# Patient Record
Sex: FEMALE | Race: WHITE | Employment: PART TIME | URBAN - METROPOLITAN AREA
[De-identification: names, ages, dates, MRNs, and addresses within clinical notes are randomized per-mention and may not be internally consistent; named-entity substitution may affect disease eponyms.]

---

## 2024-01-06 ENCOUNTER — HOSPITAL ENCOUNTER (INPATIENT)
Age: 29
LOS: 1 days | Discharge: HOME | DRG: 769 | End: 2024-01-08
Attending: OBSTETRICS & GYNECOLOGY | Admitting: OBSTETRICS & GYNECOLOGY
Payer: COMMERCIAL

## 2024-01-06 PROCEDURE — G0379 DIRECT REFER HOSPITAL OBSERV: HCPCS

## 2024-01-06 PROCEDURE — G0378 HOSPITAL OBSERVATION PER HR: HCPCS

## 2024-01-07 PROBLEM — Z98.890 POST-OPERATIVE STATE: Status: ACTIVE | Noted: 2024-01-07

## 2024-01-07 LAB
BASOPHILS # BLD: <0.03 K/UL (ref 0–0.2)
BASOPHILS NFR BLD: 0 % (ref 0–2)
BUN SERPL-MCNC: 11 MG/DL (ref 6–20)
CREAT SERPL-MCNC: 0.4 MG/DL (ref 0.5–0.9)
EOSINOPHIL # BLD: 0.13 K/UL (ref 0–0.44)
EOSINOPHILS RELATIVE PERCENT: 1 % (ref 1–4)
ERYTHROCYTE [DISTWIDTH] IN BLOOD BY AUTOMATED COUNT: 12.9 % (ref 11.8–14.4)
GFR SERPL CREATININE-BSD FRML MDRD: >60 ML/MIN/1.73M2
HCT VFR BLD AUTO: 26.3 % (ref 36.3–47.1)
HGB BLD-MCNC: 8.5 G/DL (ref 11.9–15.1)
IMM GRANULOCYTES # BLD AUTO: 0.21 K/UL (ref 0–0.3)
IMM GRANULOCYTES NFR BLD: 1 %
LYMPHOCYTES NFR BLD: 1.54 K/UL (ref 1.1–3.7)
LYMPHOCYTES RELATIVE PERCENT: 10 % (ref 24–43)
MCH RBC QN AUTO: 30.2 PG (ref 25.2–33.5)
MCHC RBC AUTO-ENTMCNC: 32.3 G/DL (ref 28.4–34.8)
MCV RBC AUTO: 93.6 FL (ref 82.6–102.9)
MONOCYTES NFR BLD: 0.96 K/UL (ref 0.1–1.2)
MONOCYTES NFR BLD: 6 % (ref 3–12)
NEUTROPHILS NFR BLD: 82 % (ref 36–65)
NEUTS SEG NFR BLD: 12.62 K/UL (ref 1.5–8.1)
NRBC BLD-RTO: 0 PER 100 WBC
PLATELET # BLD AUTO: 500 K/UL (ref 138–453)
PMV BLD AUTO: 8.3 FL (ref 8.1–13.5)
RBC # BLD AUTO: 2.81 M/UL (ref 3.95–5.11)
WBC OTHER # BLD: 15.5 K/UL (ref 3.5–11.3)

## 2024-01-07 PROCEDURE — 96375 TX/PRO/DX INJ NEW DRUG ADDON: CPT

## 2024-01-07 PROCEDURE — 36415 COLL VENOUS BLD VENIPUNCTURE: CPT

## 2024-01-07 PROCEDURE — 6370000000 HC RX 637 (ALT 250 FOR IP)

## 2024-01-07 PROCEDURE — 6360000002 HC RX W HCPCS: Performed by: OBSTETRICS & GYNECOLOGY

## 2024-01-07 PROCEDURE — 99222 1ST HOSP IP/OBS MODERATE 55: CPT | Performed by: SURGERY

## 2024-01-07 PROCEDURE — 6360000002 HC RX W HCPCS

## 2024-01-07 PROCEDURE — 84520 ASSAY OF UREA NITROGEN: CPT

## 2024-01-07 PROCEDURE — G0378 HOSPITAL OBSERVATION PER HR: HCPCS

## 2024-01-07 PROCEDURE — 2580000003 HC RX 258: Performed by: OBSTETRICS & GYNECOLOGY

## 2024-01-07 PROCEDURE — 85025 COMPLETE CBC W/AUTO DIFF WBC: CPT

## 2024-01-07 PROCEDURE — 82565 ASSAY OF CREATININE: CPT

## 2024-01-07 PROCEDURE — 96366 THER/PROPH/DIAG IV INF ADDON: CPT

## 2024-01-07 PROCEDURE — 2580000003 HC RX 258

## 2024-01-07 PROCEDURE — 96365 THER/PROPH/DIAG IV INF INIT: CPT

## 2024-01-07 RX ORDER — OXYCODONE HYDROCHLORIDE 5 MG/1
5 TABLET ORAL EVERY 4 HOURS PRN
Status: DISCONTINUED | OUTPATIENT
Start: 2024-01-07 | End: 2024-01-08 | Stop reason: HOSPADM

## 2024-01-07 RX ORDER — ONDANSETRON 2 MG/ML
4 INJECTION INTRAMUSCULAR; INTRAVENOUS EVERY 6 HOURS PRN
Status: DISCONTINUED | OUTPATIENT
Start: 2024-01-07 | End: 2024-01-08 | Stop reason: HOSPADM

## 2024-01-07 RX ORDER — METRONIDAZOLE 500 MG/1
500 TABLET ORAL 2 TIMES DAILY
Status: DISCONTINUED | OUTPATIENT
Start: 2024-01-07 | End: 2024-01-08 | Stop reason: HOSPADM

## 2024-01-07 RX ORDER — FERROUS SULFATE 325(65) MG
325 TABLET ORAL 2 TIMES DAILY
Qty: 180 TABLET | Refills: 3 | Status: SHIPPED | OUTPATIENT
Start: 2024-01-07

## 2024-01-07 RX ORDER — SODIUM CHLORIDE 9 MG/ML
INJECTION, SOLUTION INTRAVENOUS CONTINUOUS
Status: DISCONTINUED | OUTPATIENT
Start: 2024-01-07 | End: 2024-01-08 | Stop reason: HOSPADM

## 2024-01-07 RX ORDER — LACTOBACILLUS RHAMNOSUS GG 10B CELL
1 CAPSULE ORAL
Status: DISCONTINUED | OUTPATIENT
Start: 2024-01-07 | End: 2024-01-08 | Stop reason: HOSPADM

## 2024-01-07 RX ORDER — IBUPROFEN 800 MG/1
800 TABLET ORAL EVERY 8 HOURS SCHEDULED
Status: DISCONTINUED | OUTPATIENT
Start: 2024-01-07 | End: 2024-01-08

## 2024-01-07 RX ORDER — ACETAMINOPHEN 500 MG
1000 TABLET ORAL EVERY 8 HOURS SCHEDULED
Status: DISCONTINUED | OUTPATIENT
Start: 2024-01-07 | End: 2024-01-08

## 2024-01-07 RX ORDER — DOCUSATE SODIUM 100 MG/1
100 CAPSULE, LIQUID FILLED ORAL DAILY
Status: DISCONTINUED | OUTPATIENT
Start: 2024-01-07 | End: 2024-01-08 | Stop reason: HOSPADM

## 2024-01-07 RX ORDER — POLYETHYLENE GLYCOL 3350 17 G/17G
17 POWDER, FOR SOLUTION ORAL DAILY
Status: DISCONTINUED | OUTPATIENT
Start: 2024-01-07 | End: 2024-01-08 | Stop reason: HOSPADM

## 2024-01-07 RX ORDER — ONDANSETRON 4 MG/1
4 TABLET, ORALLY DISINTEGRATING ORAL EVERY 8 HOURS PRN
Status: DISCONTINUED | OUTPATIENT
Start: 2024-01-07 | End: 2024-01-08 | Stop reason: HOSPADM

## 2024-01-07 RX ADMIN — Medication 1 CAPSULE: at 09:09

## 2024-01-07 RX ADMIN — Medication 1000 MG: at 05:25

## 2024-01-07 RX ADMIN — METRONIDAZOLE 500 MG: 500 TABLET, FILM COATED ORAL at 09:09

## 2024-01-07 RX ADMIN — ACETAMINOPHEN 1000 MG: 500 TABLET ORAL at 13:13

## 2024-01-07 RX ADMIN — METRONIDAZOLE 500 MG: 500 TABLET, FILM COATED ORAL at 03:51

## 2024-01-07 RX ADMIN — SODIUM CHLORIDE: 9 INJECTION, SOLUTION INTRAVENOUS at 20:26

## 2024-01-07 RX ADMIN — METRONIDAZOLE 500 MG: 500 TABLET, FILM COATED ORAL at 20:23

## 2024-01-07 RX ADMIN — VANCOMYCIN HYDROCHLORIDE 1000 MG: 1 INJECTION, POWDER, LYOPHILIZED, FOR SOLUTION INTRAVENOUS at 20:27

## 2024-01-07 RX ADMIN — IBUPROFEN 800 MG: 800 TABLET, FILM COATED ORAL at 20:23

## 2024-01-07 RX ADMIN — POLYETHYLENE GLYCOL 3350 17 G: 17 POWDER, FOR SOLUTION ORAL at 09:10

## 2024-01-07 RX ADMIN — ACETAMINOPHEN 1000 MG: 500 TABLET ORAL at 05:26

## 2024-01-07 RX ADMIN — IBUPROFEN 800 MG: 800 TABLET, FILM COATED ORAL at 14:15

## 2024-01-07 RX ADMIN — SODIUM CHLORIDE: 9 INJECTION, SOLUTION INTRAVENOUS at 11:55

## 2024-01-07 RX ADMIN — DOCUSATE SODIUM 100 MG: 100 CAPSULE, LIQUID FILLED ORAL at 09:09

## 2024-01-07 RX ADMIN — VANCOMYCIN HYDROCHLORIDE 1500 MG: 1.5 INJECTION, POWDER, LYOPHILIZED, FOR SOLUTION INTRAVENOUS at 02:17

## 2024-01-07 RX ADMIN — ACETAMINOPHEN 1000 MG: 500 TABLET ORAL at 02:18

## 2024-01-07 RX ADMIN — VANCOMYCIN HYDROCHLORIDE 1000 MG: 1 INJECTION, POWDER, LYOPHILIZED, FOR SOLUTION INTRAVENOUS at 13:20

## 2024-01-07 RX ADMIN — IBUPROFEN 800 MG: 800 TABLET, FILM COATED ORAL at 05:26

## 2024-01-07 RX ADMIN — ACETAMINOPHEN 1000 MG: 500 TABLET ORAL at 20:23

## 2024-01-07 RX ADMIN — IBUPROFEN 800 MG: 800 TABLET, FILM COATED ORAL at 02:18

## 2024-01-07 RX ADMIN — SODIUM CHLORIDE: 9 INJECTION, SOLUTION INTRAVENOUS at 02:13

## 2024-01-07 ASSESSMENT — LIFESTYLE VARIABLES
HOW MANY STANDARD DRINKS CONTAINING ALCOHOL DO YOU HAVE ON A TYPICAL DAY: PATIENT DOES NOT DRINK
HOW OFTEN DO YOU HAVE A DRINK CONTAINING ALCOHOL: NEVER

## 2024-01-07 ASSESSMENT — ENCOUNTER SYMPTOMS
ABDOMINAL PAIN: 1
COLOR CHANGE: 1
VOMITING: 0
DIARRHEA: 0
SHORTNESS OF BREATH: 0
NAUSEA: 0

## 2024-01-07 ASSESSMENT — PAIN DESCRIPTION - LOCATION
LOCATION: ABDOMEN
LOCATION: ABDOMEN

## 2024-01-07 ASSESSMENT — PAIN DESCRIPTION - ORIENTATION
ORIENTATION: LOWER
ORIENTATION: LOWER

## 2024-01-07 ASSESSMENT — PAIN SCALES - GENERAL
PAINLEVEL_OUTOF10: 6
PAINLEVEL_OUTOF10: 4
PAINLEVEL_OUTOF10: 3

## 2024-01-07 ASSESSMENT — PAIN DESCRIPTION - DESCRIPTORS
DESCRIPTORS: SORE;SHARP
DESCRIPTORS: ACHING

## 2024-01-07 ASSESSMENT — PAIN - FUNCTIONAL ASSESSMENT: PAIN_FUNCTIONAL_ASSESSMENT: ACTIVITIES ARE NOT PREVENTED

## 2024-01-07 NOTE — CARE COORDINATION
DISCHARGE PLANNING EVALUATION: OP/OBSERVATION        24, 4:16 PM TROY    Maribell Tellez         Location: OBS    Reason for hospitalization:  section wound seroma, postpartum [O90.89]  Post-operative state [Z98.890]     Patient sleeping during visit

## 2024-01-07 NOTE — DISCHARGE SUMMARY
OBGyn Discharge Summary  [facility]      Patient Name: Maribell Tellez  Patient : 1995  Primary Care Physician: No primary care provider on file.  Admit Date: 2024    Principal Diagnosis:  Incisional Site Abscess vs Seroma     Other Diagnosis:    section wound seroma, postpartum [O90.89]  Post-operative state [Z98.890]  Postoperative abscess [T81.49XA]  Patient Active Problem List   Diagnosis     section wound seroma, postpartum    PLTCS 23 2/2 Transverse Fetal Lie    Postoperative abscess       Infection: Seroma vs abscess (awaiting cultures)  Hospital Acquired: Possibly in postoperative period    Surgical Operations & Procedures: IR aspiration    Consultations: general surgery and interventional radiology    Pertinent Findings & Procedures:   Maribell Tellez is a 28 y.o. female admitted for further evaluation and management of Incisional Site Abscess vs Seroma ; received Rocephin, Vanc, Flagyl.  She underwent IR aspiration on 24. Follow up in 1 week. Discharge instructions reviewed and questions answered.    HD #1 (24): WBC increased from 13.4 in OSH to 15.5. Hgb noted to be 8.5. PO iron sent on d/c. General surgery consulted and recommended continued antibiotics and observation.    HD#2 (24):  WBC decreased to 13.2. Hgb 9.1. General Surgery recommended IR aspiration and drain placement, patient underwent IR aspiration. Patient discharged home with Clindamycin x10 days.    Discharge to: Home    Readmission planned: No    Recommendations on Discharge:     Medications:       Medication List        START taking these medications      clindamycin 300 MG capsule  Commonly known as: CLEOCIN  Take 1 capsule by mouth 3 times daily for 10 days     ferrous sulfate 325 (65 Fe) MG tablet  Commonly known as: IRON 325  Take 1 tablet by mouth 2 times daily               Where to Get Your Medications        These medications were sent to [facility] -

## 2024-01-07 NOTE — H&P
OB/GYN H&P  [facility]    Patient Name: Maribell Tellez     Patient : 1995  Room/Bed: OBS   Admission Date/Time: 2024 11:20 PM  Primary Care Physician: No primary care provider on file.    CC: C/S incisional pain             HPI: Maribell Tellez is a 28 y.o.  who presents s/p PLTCS on 23 for concern of an incisional site abscess/seroma.  Patient was transferred from [facility] after being admitted on 23 with fevers at home and abdominal pain.  Patient was found to have a fever up to 1 3 in the emergency department.  At that time, WBC count was noted to be 13.4.  CT abdomen pelvis done at that time showed \"Thickening of lower rectus muscle which also contains some fluid. Fluid approximately 9.5 x 1.5 x 8 cm in size. Could be seroma or evolving hematoma. Abscess not excluded.\"    Patient states that she feels increased pain on her left side versus right.  She states at first there was no indication or abnormality on visual inspection of her  section incision however from Thursday night onward she noted an increasing red, warm to touch, tender site clearly discernible from the surrounding tissue on her incision.    Patient is a transfer from [facility] due to desire for higher care in the inpatient setting.    Patient currently rates her pain a 4 out of 10.  States she has a \"high pain tolerance\" however this abscess versus seroma is very painful.    Patient is pumping and breast-feeding, denies signs and symptoms of mastitis.  Denies signs and symptoms of urinary tract infections such as dysuria, hematuria, urinary frequency.  Additionally, patient denies foul-smelling vaginal discharge or pelvic pain.    REVIEW OF SYSTEMS:   A minimum of an eleven point review of systems was completed.    Constitutional: negative fever, positive intermittent chills   HEENT: negative visual disturbances, negative headaches  Respiratory: negative dyspnea, negative

## 2024-01-07 NOTE — PLAN OF CARE
Problem: Discharge Planning  Goal: Discharge to home or other facility with appropriate resources  Outcome: Progressing  Flowsheets  Taken 1/7/2024 0036  Discharge to home or other facility with appropriate resources: Identify barriers to discharge with patient and caregiver  Taken 1/7/2024 0035  Discharge to home or other facility with appropriate resources:   Identify barriers to discharge with patient and caregiver   Arrange for needed discharge resources and transportation as appropriate   Identify discharge learning needs (meds, wound care, etc)

## 2024-01-07 NOTE — CONSULTS
General Surgery  Consult    PATIENT NAME: Maribell Tellez  AGE: 28 y.o.  MEDICAL RECORD NO. [id number]  DATE: 2024  SURGEON: Dr. George  PRIMARY CARE PHYSICIAN: No primary care provider on file.    Patient evaluated at the request of  Dr. Hankins  Reason for evaluation: Recent , post-op seroma vs abscess    Patient information was obtained from patient and past medical records.  History/Exam limitations: none.    IMPRESSION:     Patient Active Problem List   Diagnosis     section wound seroma, postpartum    PLTCS 23 2/2 Transverse Fetal Lie   27 y/o female with history of recent , concern for post-op seroma vs abscess formation      PLAN:   -Patient seen and evaluated.  History, physical exam, laboratory, and radiographic findings reviewed and discussed with attending.  -No acute surgical intervention indicated at this time.   -Plan to continue to monitor clinically on antibiotics. If symptoms fail to improve or pt clinically worsens, will consider need for further intervention.   -Continue antibiotics per primary.   -Monitor vitals per unit standard  -Remainder of plan and disposition per primary team.        HISTORY:   History of Chief Complaint:    Maribell Tellez is a 28 y.o. female with history of recent  section performed on 2023, who presents with several days of incision site pain and complains of redness around the incision.  Patient states that her symptoms began 2 days ago.  Patient states that yesterday she began experiencing chills and subjective fevers which prompted her to seek evaluation in the emergency department.  Patient originally presented to [facility]'s emergency department patient was found to have a mild leukocytosis and CT findings concerning for possible seroma versus abscess formation at recent incision site.  Patient was transferred to [place] for further evaluation and treatment.  Patient was started on IV antibiotics including

## 2024-01-08 ENCOUNTER — APPOINTMENT (OUTPATIENT)
Dept: INTERVENTIONAL RADIOLOGY/VASCULAR | Age: 29
DRG: 769 | End: 2024-01-08
Attending: OBSTETRICS & GYNECOLOGY
Payer: COMMERCIAL

## 2024-01-08 VITALS
DIASTOLIC BLOOD PRESSURE: 79 MMHG | TEMPERATURE: 97.5 F | SYSTOLIC BLOOD PRESSURE: 117 MMHG | HEIGHT: 67 IN | WEIGHT: 155.87 LBS | OXYGEN SATURATION: 96 % | BODY MASS INDEX: 24.46 KG/M2 | HEART RATE: 70 BPM | RESPIRATION RATE: 18 BRPM

## 2024-01-08 PROBLEM — T81.49XA POSTOPERATIVE ABSCESS: Status: ACTIVE | Noted: 2024-01-08

## 2024-01-08 LAB
BASOPHILS # BLD: <0.03 K/UL (ref 0–0.2)
BASOPHILS NFR BLD: 0 % (ref 0–2)
EOSINOPHIL # BLD: 0.14 K/UL (ref 0–0.44)
EOSINOPHILS RELATIVE PERCENT: 1 % (ref 1–4)
ERYTHROCYTE [DISTWIDTH] IN BLOOD BY AUTOMATED COUNT: 13.1 % (ref 11.8–14.4)
HCT VFR BLD AUTO: 28.8 % (ref 36.3–47.1)
HGB BLD-MCNC: 9.1 G/DL (ref 11.9–15.1)
IMM GRANULOCYTES # BLD AUTO: 0.09 K/UL (ref 0–0.3)
IMM GRANULOCYTES NFR BLD: 1 %
INR PPP: 1
LYMPHOCYTES NFR BLD: 1.83 K/UL (ref 1.1–3.7)
LYMPHOCYTES RELATIVE PERCENT: 14 % (ref 24–43)
MCH RBC QN AUTO: 30.2 PG (ref 25.2–33.5)
MCHC RBC AUTO-ENTMCNC: 31.6 G/DL (ref 28.4–34.8)
MCV RBC AUTO: 95.7 FL (ref 82.6–102.9)
MONOCYTES NFR BLD: 0.58 K/UL (ref 0.1–1.2)
MONOCYTES NFR BLD: 4 % (ref 3–12)
NEUTROPHILS NFR BLD: 80 % (ref 36–65)
NEUTS SEG NFR BLD: 10.53 K/UL (ref 1.5–8.1)
NRBC BLD-RTO: 0 PER 100 WBC
PLATELET # BLD AUTO: 576 K/UL (ref 138–453)
PMV BLD AUTO: 8.6 FL (ref 8.1–13.5)
PROTHROMBIN TIME: 13.3 SEC (ref 11.7–14.9)
RBC # BLD AUTO: 3.01 M/UL (ref 3.95–5.11)
WBC OTHER # BLD: 13.2 K/UL (ref 3.5–11.3)

## 2024-01-08 PROCEDURE — 99232 SBSQ HOSP IP/OBS MODERATE 35: CPT | Performed by: SURGERY

## 2024-01-08 PROCEDURE — 0W9F3ZX DRAINAGE OF ABDOMINAL WALL, PERCUTANEOUS APPROACH, DIAGNOSTIC: ICD-10-PCS | Performed by: RADIOLOGY

## 2024-01-08 PROCEDURE — 99231 SBSQ HOSP IP/OBS SF/LOW 25: CPT | Performed by: STUDENT IN AN ORGANIZED HEALTH CARE EDUCATION/TRAINING PROGRAM

## 2024-01-08 PROCEDURE — 85025 COMPLETE CBC W/AUTO DIFF WBC: CPT

## 2024-01-08 PROCEDURE — 87070 CULTURE OTHR SPECIMN AEROBIC: CPT

## 2024-01-08 PROCEDURE — 6370000000 HC RX 637 (ALT 250 FOR IP)

## 2024-01-08 PROCEDURE — 10160 PNXR ASPIR ABSC HMTMA BULLA: CPT

## 2024-01-08 PROCEDURE — 1200000000 HC SEMI PRIVATE

## 2024-01-08 PROCEDURE — 6360000002 HC RX W HCPCS: Performed by: OBSTETRICS & GYNECOLOGY

## 2024-01-08 PROCEDURE — 6360000002 HC RX W HCPCS

## 2024-01-08 PROCEDURE — 76942 ECHO GUIDE FOR BIOPSY: CPT

## 2024-01-08 PROCEDURE — 6370000000 HC RX 637 (ALT 250 FOR IP): Performed by: STUDENT IN AN ORGANIZED HEALTH CARE EDUCATION/TRAINING PROGRAM

## 2024-01-08 PROCEDURE — 2580000003 HC RX 258

## 2024-01-08 PROCEDURE — 96366 THER/PROPH/DIAG IV INF ADDON: CPT

## 2024-01-08 PROCEDURE — 96376 TX/PRO/DX INJ SAME DRUG ADON: CPT

## 2024-01-08 PROCEDURE — 85610 PROTHROMBIN TIME: CPT

## 2024-01-08 PROCEDURE — 87075 CULTR BACTERIA EXCEPT BLOOD: CPT

## 2024-01-08 PROCEDURE — 87205 SMEAR GRAM STAIN: CPT

## 2024-01-08 PROCEDURE — 36415 COLL VENOUS BLD VENIPUNCTURE: CPT

## 2024-01-08 PROCEDURE — 2580000003 HC RX 258: Performed by: OBSTETRICS & GYNECOLOGY

## 2024-01-08 RX ORDER — IBUPROFEN 600 MG/1
600 TABLET ORAL EVERY 6 HOURS
Status: DISCONTINUED | OUTPATIENT
Start: 2024-01-08 | End: 2024-01-08 | Stop reason: HOSPADM

## 2024-01-08 RX ORDER — ACETAMINOPHEN 500 MG
1000 TABLET ORAL EVERY 6 HOURS
Status: DISCONTINUED | OUTPATIENT
Start: 2024-01-08 | End: 2024-01-08 | Stop reason: HOSPADM

## 2024-01-08 RX ORDER — CLINDAMYCIN HYDROCHLORIDE 300 MG/1
300 CAPSULE ORAL 3 TIMES DAILY
Qty: 30 CAPSULE | Refills: 0 | Status: SHIPPED | OUTPATIENT
Start: 2024-01-08 | End: 2024-01-18

## 2024-01-08 RX ADMIN — Medication 1 CAPSULE: at 08:29

## 2024-01-08 RX ADMIN — IBUPROFEN 600 MG: 600 TABLET, FILM COATED ORAL at 16:29

## 2024-01-08 RX ADMIN — ACETAMINOPHEN 1000 MG: 500 TABLET ORAL at 05:18

## 2024-01-08 RX ADMIN — DOCUSATE SODIUM 100 MG: 100 CAPSULE, LIQUID FILLED ORAL at 08:29

## 2024-01-08 RX ADMIN — IBUPROFEN 600 MG: 600 TABLET, FILM COATED ORAL at 11:35

## 2024-01-08 RX ADMIN — ACETAMINOPHEN 1000 MG: 500 TABLET ORAL at 11:34

## 2024-01-08 RX ADMIN — Medication 1000 MG: at 01:37

## 2024-01-08 RX ADMIN — POLYETHYLENE GLYCOL 3350 17 G: 17 POWDER, FOR SOLUTION ORAL at 08:29

## 2024-01-08 RX ADMIN — IBUPROFEN 800 MG: 800 TABLET, FILM COATED ORAL at 05:18

## 2024-01-08 RX ADMIN — VANCOMYCIN HYDROCHLORIDE 1000 MG: 1 INJECTION, POWDER, LYOPHILIZED, FOR SOLUTION INTRAVENOUS at 12:20

## 2024-01-08 RX ADMIN — VANCOMYCIN HYDROCHLORIDE 1000 MG: 1 INJECTION, POWDER, LYOPHILIZED, FOR SOLUTION INTRAVENOUS at 05:17

## 2024-01-08 RX ADMIN — SODIUM CHLORIDE: 9 INJECTION, SOLUTION INTRAVENOUS at 05:16

## 2024-01-08 RX ADMIN — METRONIDAZOLE 500 MG: 500 TABLET, FILM COATED ORAL at 08:29

## 2024-01-08 RX ADMIN — ACETAMINOPHEN 1000 MG: 500 TABLET ORAL at 16:29

## 2024-01-08 ASSESSMENT — PAIN DESCRIPTION - LOCATION
LOCATION: ABDOMEN

## 2024-01-08 ASSESSMENT — PAIN SCALES - GENERAL
PAINLEVEL_OUTOF10: 3
PAINLEVEL_OUTOF10: 0

## 2024-01-08 ASSESSMENT — PAIN DESCRIPTION - ORIENTATION
ORIENTATION: LEFT;MID
ORIENTATION: LEFT;MID

## 2024-01-08 NOTE — PLAN OF CARE
No general surgery intervention. Okay for discharge.     Valentina Goldstein MD  PGY-4 General Surgery  5:08 PM 01/08/24

## 2024-01-08 NOTE — CARE COORDINATION
DISCHARGE PLANNING EVALUATION: OP/OBSERVATION        24, 9:44 AM TROY Tellez         Location: OBS    Reason for hospitalization:  section wound seroma, postpartum [O90.89]  Post-operative state [Z98.890]     CM Services requested for transitional needs.     PCP: No primary care provider on file.    Transportation/Food Security/Housekeeping Addressed:  No issues identified.     Equipment needs:     Transition plan: no anticipated needs

## 2024-01-08 NOTE — BRIEF OP NOTE
Brief Postoperative Note    Maribell Tellez  YOB: 1995  [id number]    Pre-operative Diagnosis: anterior abdominal wall fluid collection.      Post-operative Diagnosis: Same    Procedure: Fluid collection drainage    Medication Given: none    Anesthesia: 1%Lidocaine     Surgeons/Assistants:  MD Hitesh     Estimated Blood Loss: Minimal    Complications: none    Specimen: Was collected    Procedure:  Successful aspiration of anterior abdominal wall fluid collection  Approximately 4 mL of bloody fluid was obtained and sent for cultures.  Pt tolerated procedure well and left the department in stable condition.      Electronically signed by BROOK Pacheco on 1/8/2024 at 4:37 PM

## 2024-01-08 NOTE — PROGRESS NOTES
PROGRESS NOTE          PATIENT NAME: Maribell Tellez  MEDICAL RECORD NO. [id number]  DATE: 2024  SURGEON: Ariel  PRIMARY CARE PHYSICIAN: No primary care provider on file.    HD: # 0    ASSESSMENT    Patient Active Problem List   Diagnosis     section wound seroma, postpartum    PLTCS 23 2/2 Transverse Fetal Lie       MEDICAL DECISION MAKING AND PLAN    Plan for IR to evaluate for possible aspiration/drainage of fluid collection at pfannenstiel incision.   INR pending      Chief Complaint: \"I feel ok\"    SUBJECTIVE    Maribell Tellez was seen and evaluated. Afebrile VSS. Abdominal pain not improved but not worse. Continues to have leukocytosis.       OBJECTIVE  VITALS: Temp: Temp: 97.5 °F (36.4 °C)Temp  Av.8 °F (36.6 °C)  Min: 97.5 °F (36.4 °C)  Max: 98.2 °F (36.8 °C) BP Systolic (24hrs), Av , Min:111 , Max:121   Diastolic (24hrs), Av, Min:64, Max:79   Pulse Pulse  Av.4  Min: 70  Max: 97 Resp Resp  Av.4  Min: 14  Max: 19 Pulse ox SpO2  Av %  Min: 96 %  Max: 98 %  GENERAL: alert, no distress  NEURO: GCS 15  HEENT: normocephalic, atraumatic   : deferred  LUNGS: normal effort   HEART: normal rate and regular rhythm  ABDOMEN: soft,TTP at pfannenstiel incision, induration of mons and fullness on abdominal wall   EXTREMITY: no cyanosis, clubbing or edema    I/O last 3 completed shifts:  In: 2480 [P.O.:1280; I.V.:1200]  Out: -     Drain/tube output:  In: 2480 [P.O.:1280; I.V.:1200]  Out: -     LAB:  CBC:   Recent Labs     24  0243 24  0549   WBC 15.5* 13.2*   HGB 8.5* 9.1*   HCT 26.3* 28.8*   MCV 93.6 95.7   * 576*     BMP:   Recent Labs     24  0243   BUN 11   CREATININE 0.4*     COAGS: No results for input(s): \"APTT\", \"PROT\", \"INR\" in the last 72 hours.    RADIOLOGY:  No results found.        Valentina Goldstein MD  24, 8:37 AM      
[facility]   Pharmacy Pharmacokinetic Monitoring Service - Vancomycin     Maribell Tellez is a 28 y.o. female starting on vancomycin therapy for surgical site infection. Pharmacy consulted by Shelly Hughes  for monitoring and adjustment.    Target Concentration: Goal trough of 10-15 mg/L and AUC/STEPHEN <500 mg*hr/L    Additional Antimicrobials: ceftriaxone    Pertinent Laboratory Values:   Wt Readings from Last 1 Encounters:   01/07/24 70.7 kg (155 lb 13.8 oz)     Temp Readings from Last 1 Encounters:   01/06/24 98.2 °F (36.8 °C) (Oral)     CrCl cannot be calculated (No successful lab value found.).  No results for input(s): \"CREATININE\", \"BUN\", \"WBC\" in the last 72 hours.  Procalcitonin:     Pertinent Cultures:  Culture Date Source Results        MRSA Nasal Swab: N/A. Non-respiratory infection.    Plan:  sCr lab is pending, will start vancomycin 1500 mg IV x1 dose now  Pharmacy will continue to monitor patient and adjust therapy as indicated    Thank you for the consult,  Daniel Mobley RPH  1/7/2024 2:04 AM    
[facility]   Pharmacy Pharmacokinetic Monitoring Service - Vancomycin    Consulting Provider: Dr. Cagle   Indication: Surgical site infection  Target Concentration: Goal trough of 10-15 mg/L and AUC/STEPHEN <500 mg*hr/L  Day of Therapy: 1  Additional Antimicrobials: Ceftriaxone, metronidazole    Pertinent Laboratory Values:   Wt Readings from Last 1 Encounters:   01/07/24 70.7 kg (155 lb 13.8 oz)     Temp Readings from Last 1 Encounters:   01/07/24 97.9 °F (36.6 °C) (Oral)     Estimated Creatinine Clearance: 207 mL/min (A) (based on SCr of 0.4 mg/dL (L)).  Recent Labs     01/07/24  0243   CREATININE 0.4*   BUN 11   WBC 15.5*       Recent vancomycin administrations                     vancomycin (VANCOCIN) 1,500 mg in sodium chloride 0.9 % 250 mL IVPB ([id number]) (mg) 1,500 mg New Bag 01/07/24 0217                    Plan:  Will schedule vancomycin 1000 mg IV q8h, which predicts a trough of 10.6 mcg/mL and an AUC of 429.  Pharmacy will continue to monitor patient and adjust therapy as indicated    Thank you for the consult,  Tom Newton RPH  1/7/2024 12:13 PM    
Discussed discharge summary with patient. Instructed patient about medications & follow up appointments. Patient denies any additional questions. Patient was discharged with all belongings. No distress noted. Patient discharged to home. T  
OB/GYN Resident Interval Note    Patient s/p IR aspiration of pfannenstiel incision seroma vs abscess, 4mL bloody fluid. Patient stable for discharge from General Surgery standpoint. Patient has remained afebrile with downtrending WBC. Patient reports clinical improvement of pain as well. Erythema noted to improve significantly as well. Will discharge with 10 days of Clindamycin and patient may follow up with primary OBGYN.    Vitals:    01/07/24 1952 01/07/24 2022 01/08/24 0518 01/08/24 0745   BP: 119/72  115/64 117/79   Pulse: 97  73 70   Resp: 19  14 18   Temp: 97.5 °F (36.4 °C)  97.9 °F (36.6 °C) 97.5 °F (36.4 °C)   TempSrc: Oral  Oral Oral   SpO2: 97% 98% 97% 96%   Weight:       Height:             Daylin Parekh DO  OB/GYN Resident   
OB/GYN Resident Interval Note    Presented to patient's room for reevaluation however patient at interventional radiology for aspiration and possible drain placement.    Daylin Parekh DO  OB/GYN Resident   
Obstetric/Gynecology Resident Interval Note    Labs reviewed from admission.  WBC noted to be increased from 13.4 (at outside hospital OSH) to 15.5 on admit labs. Patient's vital signs are stable, afebrile.  Patient currently on vancomycin, Flagyl, Rocephin.  Due to the nature of the incisional abscess/seroma, decision was made at this time to consult general surgery for evaluation of incision.    Continue to monitor closely.     Shelly Cagle MD  OB/GYN Resident, PGY2  [place], [place]  1/7/2024, 6:41 AM     
Pt arrives to room for pelvic abscess aspiration  Dr Proctor to room   KP RT to bedside  Site prepped and draped  Access obtained and 4ml thick red fluid aspirated for culture  Access removed and site covered with band aid  Tolerated well  Return to floor    
recommendations.   - Will discuss continued management with antibiotics vs drainage with General Surgery this AM.   - Continue post-op care, please page with any questions      Daylin Parekh DO  Ob/Gyn Resident    1/8/2024, 6:58 AM         Attending Physician Statement  I have discussed the care of Maribell Tellez, including pertinent history and exam findings,  with the resident. I have reviewed the key elements of all parts of the encounter with the resident.  I agree with the assessment, plan and orders as documented by the resident.  (GC Modifier)    Electronically signed by Quinn Webber DO  1/8/2024  10:52 AM    Appreciated Gen Surgery input and will await IR drainage today.

## 2024-01-08 NOTE — PLAN OF CARE
Problem: Discharge Planning  Goal: Discharge to home or other facility with appropriate resources  1/7/2024 2142 by Umberto Little, RN  Outcome: Progressing  1/7/2024 1237 by Peyton Dasilva RN  Outcome: Progressing     Problem: Pain  Goal: Verbalizes/displays adequate comfort level or baseline comfort level  Outcome: Progressing

## 2024-01-13 LAB
MICROORGANISM SPEC CULT: NORMAL
MICROORGANISM/AGENT SPEC: NORMAL
MICROORGANISM/AGENT SPEC: NORMAL
SPECIMEN DESCRIPTION: NORMAL